# Patient Record
Sex: FEMALE | Race: BLACK OR AFRICAN AMERICAN | URBAN - METROPOLITAN AREA
[De-identification: names, ages, dates, MRNs, and addresses within clinical notes are randomized per-mention and may not be internally consistent; named-entity substitution may affect disease eponyms.]

---

## 2024-03-13 ENCOUNTER — LAB ENCOUNTER (OUTPATIENT)
Dept: LAB | Age: 24
End: 2024-03-13
Attending: FAMILY MEDICINE
Payer: COMMERCIAL

## 2024-03-13 ENCOUNTER — OFFICE VISIT (OUTPATIENT)
Dept: FAMILY MEDICINE CLINIC | Facility: CLINIC | Age: 24
End: 2024-03-13

## 2024-03-13 VITALS
HEIGHT: 62 IN | BODY MASS INDEX: 38.64 KG/M2 | WEIGHT: 210 LBS | SYSTOLIC BLOOD PRESSURE: 126 MMHG | HEART RATE: 88 BPM | DIASTOLIC BLOOD PRESSURE: 85 MMHG

## 2024-03-13 DIAGNOSIS — M79.671 BILATERAL FOOT PAIN: ICD-10-CM

## 2024-03-13 DIAGNOSIS — L70.0 ACNE VULGARIS: ICD-10-CM

## 2024-03-13 DIAGNOSIS — M79.672 BILATERAL FOOT PAIN: ICD-10-CM

## 2024-03-13 DIAGNOSIS — Z00.00 ROUTINE HEALTH MAINTENANCE: Primary | ICD-10-CM

## 2024-03-13 LAB
ALBUMIN SERPL-MCNC: 4.4 G/DL (ref 3.2–4.8)
ALBUMIN/GLOB SERPL: 1.3 {RATIO} (ref 1–2)
ALP LIVER SERPL-CCNC: 68 U/L
ALT SERPL-CCNC: 18 U/L
ANION GAP SERPL CALC-SCNC: 6 MMOL/L (ref 0–18)
AST SERPL-CCNC: 27 U/L (ref ?–34)
BASOPHILS # BLD AUTO: 0.01 X10(3) UL (ref 0–0.2)
BASOPHILS NFR BLD AUTO: 0.2 %
BILIRUB SERPL-MCNC: 0.3 MG/DL (ref 0.3–1.2)
BUN BLD-MCNC: 12 MG/DL (ref 9–23)
BUN/CREAT SERPL: 12.4 (ref 10–20)
CALCIUM BLD-MCNC: 9.4 MG/DL (ref 8.7–10.4)
CHLORIDE SERPL-SCNC: 107 MMOL/L (ref 98–112)
CHOLEST SERPL-MCNC: 177 MG/DL (ref ?–200)
CO2 SERPL-SCNC: 28 MMOL/L (ref 21–32)
CREAT BLD-MCNC: 0.97 MG/DL
DEPRECATED RDW RBC AUTO: 40.2 FL (ref 35.1–46.3)
EGFRCR SERPLBLD CKD-EPI 2021: 84 ML/MIN/1.73M2 (ref 60–?)
EOSINOPHIL # BLD AUTO: 0.24 X10(3) UL (ref 0–0.7)
EOSINOPHIL NFR BLD AUTO: 4.3 %
ERYTHROCYTE [DISTWIDTH] IN BLOOD BY AUTOMATED COUNT: 13.1 % (ref 11–15)
FASTING PATIENT LIPID ANSWER: NO
FASTING STATUS PATIENT QL REPORTED: NO
GLOBULIN PLAS-MCNC: 3.5 G/DL (ref 2.8–4.4)
GLUCOSE BLD-MCNC: 78 MG/DL (ref 70–99)
HCT VFR BLD AUTO: 36.9 %
HDLC SERPL-MCNC: 48 MG/DL (ref 40–59)
HGB BLD-MCNC: 12.5 G/DL
IMM GRANULOCYTES # BLD AUTO: 0.01 X10(3) UL (ref 0–1)
IMM GRANULOCYTES NFR BLD: 0.2 %
LDLC SERPL CALC-MCNC: 98 MG/DL (ref ?–100)
LYMPHOCYTES # BLD AUTO: 2.15 X10(3) UL (ref 1–4)
LYMPHOCYTES NFR BLD AUTO: 38.9 %
MCH RBC QN AUTO: 29.1 PG (ref 26–34)
MCHC RBC AUTO-ENTMCNC: 33.9 G/DL (ref 31–37)
MCV RBC AUTO: 86 FL
MONOCYTES # BLD AUTO: 0.43 X10(3) UL (ref 0.1–1)
MONOCYTES NFR BLD AUTO: 7.8 %
NEUTROPHILS # BLD AUTO: 2.69 X10 (3) UL (ref 1.5–7.7)
NEUTROPHILS # BLD AUTO: 2.69 X10(3) UL (ref 1.5–7.7)
NEUTROPHILS NFR BLD AUTO: 48.6 %
NONHDLC SERPL-MCNC: 129 MG/DL (ref ?–130)
OSMOLALITY SERPL CALC.SUM OF ELEC: 291 MOSM/KG (ref 275–295)
PLATELET # BLD AUTO: 229 10(3)UL (ref 150–450)
POTASSIUM SERPL-SCNC: 4 MMOL/L (ref 3.5–5.1)
PROT SERPL-MCNC: 7.9 G/DL (ref 5.7–8.2)
RBC # BLD AUTO: 4.29 X10(6)UL
SODIUM SERPL-SCNC: 141 MMOL/L (ref 136–145)
TRIGL SERPL-MCNC: 180 MG/DL (ref 30–149)
VLDLC SERPL CALC-MCNC: 30 MG/DL (ref 0–30)
WBC # BLD AUTO: 5.5 X10(3) UL (ref 4–11)

## 2024-03-13 PROCEDURE — 80061 LIPID PANEL: CPT | Performed by: FAMILY MEDICINE

## 2024-03-13 PROCEDURE — 36415 COLL VENOUS BLD VENIPUNCTURE: CPT | Performed by: FAMILY MEDICINE

## 2024-03-13 PROCEDURE — 80053 COMPREHEN METABOLIC PANEL: CPT | Performed by: FAMILY MEDICINE

## 2024-03-13 PROCEDURE — 99385 PREV VISIT NEW AGE 18-39: CPT | Performed by: FAMILY MEDICINE

## 2024-03-13 PROCEDURE — 90471 IMMUNIZATION ADMIN: CPT | Performed by: FAMILY MEDICINE

## 2024-03-13 PROCEDURE — 90651 9VHPV VACCINE 2/3 DOSE IM: CPT | Performed by: FAMILY MEDICINE

## 2024-03-13 PROCEDURE — 85025 COMPLETE CBC W/AUTO DIFF WBC: CPT | Performed by: FAMILY MEDICINE

## 2024-03-13 RX ORDER — MINOCYCLINE HYDROCHLORIDE 100 MG/1
CAPSULE ORAL
COMMUNITY
Start: 2024-02-16

## 2024-03-13 RX ORDER — AZELAIC ACID 0.15 G/G
GEL TOPICAL
COMMUNITY
Start: 2024-02-16

## 2024-03-13 NOTE — PROGRESS NOTES
Tiara Baxter is a 23 year old female.  Chief Complaint   Patient presents with    Routine Physical    Foot Pain     C/o bilateral foot pain, pain is more on the bottom of the foot       HPI:   Patient is a 23-year-old new patient presents today for routine health care to establish care.  Patient seeing a dermatologist for acne currently on a retinoid and minocycline.  Patient having bilateral foot pain has bilateral flatfeet.    Current Outpatient Medications   Medication Sig Dispense Refill    minocycline 100 MG Oral Cap       Azelaic Acid 15 % External Gel         History reviewed. No pertinent past medical history.   History reviewed. No pertinent surgical history.   Social History:  Social History     Socioeconomic History    Marital status: Single   Tobacco Use    Smoking status: Never    Smokeless tobacco: Never   Vaping Use    Vaping Use: Never used   Substance and Sexual Activity    Drug use: Yes     Comment: socially        REVIEW OF SYSTEMS:   GENERAL HEALTH: No fevers, chills, sweats, fatigue  VISION: No recent vision problems, blurry vision or double vision  HEENT: No decreased hearing ear pain nasal congestion or sore throat  SKIN: denies any unusual skin lesions or rashes  RESPIRATORY: denies shortness of breath, cough, wheezing  CARDIOVASCULAR: denies chest pain on exertion, palpitations, swelling in feet  GI: denies abdominal pain and denies heartburn, nausea or vomiting  : No Pain on urination, change in the color of urine, discharge, urinating frequently  MUS: No back pain, joint pain, muscle pain  NEURO: denies headaches , anxiety, depression    EXAM:   /85 (BP Location: Left arm, Patient Position: Sitting, Cuff Size: large)   Pulse 88   Ht 5' 2\" (1.575 m)   Wt 210 lb (95.3 kg)   LMP 03/10/2024 (Exact Date)   BMI 38.41 kg/m²   GENERAL: well developed, well nourished,in no apparent distress  SKIN: no rashes,no suspicious lesions  HEENT: atraumatic, normocephalic,ears and throat are  clear,   NECK: supple,no adenopathy,  LUNGS: clear to auscultation, no wheeze  CARDIO: RRR without murmur  GI: good BS's,no masses or tenderness  EXTREMITIES: no cyanosis, or edema    ASSESSMENT AND PLAN:   1. Routine health maintenance  Discussed diet and exercise with patient today we will draw labs today.  - CBC With Differential With Platelet  - Comp Metabolic Panel (14)  - Lipid Panel    2. Bilateral foot pain  Referral to podiatry given for possible inserts.  - Podiatry Referral - In Network    3. Acne vulgaris  Offered patient birth control pills in addition to what she is taking she is going to think about it.       The patient indicates understanding of these issues and agrees to the plan.  No follow-ups on file.

## 2024-03-24 ENCOUNTER — HOSPITAL ENCOUNTER (OUTPATIENT)
Age: 24
Discharge: HOME | End: 2024-03-24
Payer: COMMERCIAL

## 2024-03-24 VITALS
HEART RATE: 80 BPM | SYSTOLIC BLOOD PRESSURE: 142 MMHG | DIASTOLIC BLOOD PRESSURE: 82 MMHG | TEMPERATURE: 98 F | OXYGEN SATURATION: 100 % | RESPIRATION RATE: 20 BRPM

## 2024-03-24 DIAGNOSIS — B37.31 VAGINAL YEAST INFECTION: Primary | ICD-10-CM

## 2024-03-24 LAB — B-HCG UR QL: NEGATIVE

## 2024-03-24 PROCEDURE — 81025 URINE PREGNANCY TEST: CPT | Performed by: PHYSICIAN ASSISTANT

## 2024-03-24 PROCEDURE — 99213 OFFICE O/P EST LOW 20 MIN: CPT | Performed by: PHYSICIAN ASSISTANT

## 2024-03-24 RX ORDER — FLUCONAZOLE 150 MG/1
150 TABLET ORAL ONCE
Qty: 1 TABLET | Refills: 1 | Status: SHIPPED | OUTPATIENT
Start: 2024-03-24 | End: 2024-03-24

## 2024-03-24 NOTE — ED INITIAL ASSESSMENT (HPI)
Pt with thick, white vaginal discharge since Friday, then minor itching today; denies fever, urinary symptoms, or pelvic pain

## 2024-03-24 NOTE — ED PROVIDER NOTES
Patient Seen in: [facility]      History     Chief Complaint   Patient presents with    Vaginal Problem     Stated Complaint: Eval G    Subjective:   HPI    Patient is a healthy 23-year-old female who presents to immediate care due to vaginal discharge x 1 week.  States worsening white clumpy vaginal discharge.  Notes mild intermittent itching.  Symptoms feel similar to previous vaginal yeast infection.  Patient denies known STI exposure.  Denies pelvic pain flank pain fever dysuria hematuria.    Objective:   History reviewed. No pertinent past medical history.           History reviewed. No pertinent surgical history.             Social History     Socioeconomic History    Marital status: Single   Tobacco Use    Smoking status: Never    Smokeless tobacco: Never   Vaping Use    Vaping Use: Never used   Substance and Sexual Activity    Drug use: Yes     Comment: socially              Review of Systems    Positive for stated complaint: Eval G  Other systems are as noted in HPI.  Constitutional and vital signs reviewed.      All other systems reviewed and negative except as noted above.    Physical Exam     ED Triage Vitals [03/24/24 1203]   /82   Pulse 80   Resp 20   Temp 97.6 °F (36.4 °C)   Temp src Temporal   SpO2 100 %   O2 Device None (Room air)       Current:/82   Pulse 80   Temp 97.6 °F (36.4 °C) (Temporal)   Resp 20   LMP 03/03/2024 (Approximate)   SpO2 100%         Physical Exam    Vital signs reviewed. Nursing note reviewed.  Constitutional: Well-developed. Well-nourished. In no acute distress  HENT: Mucous membranes moist.   EYES: No scleral icterus or conjunctival injection.  NECK: Full ROM. Supple.   CARDIAC: Normal rate. Normal S1/ S2. 2+ distal pulses. No edema  PULM/CHEST: Clear to auscultation bilaterally. No wheezes  ABD: Soft, non-tender, non-distended.   : No CVA tenderness.  White clumpy vaginal discharge.  No CMT no adnexal tenderness  RECTAL:  deferred  Extremities: Full ROM  NEURO: Awake, alert, following commands, moving extremities, answering questions.   SKIN: Warm and dry. No rash or lesions.  PSYCH: Normal judgment. Normal affect.          ED Course     Labs Reviewed   POCT PREGNANCY URINE - Normal   VAGINITIS VAGINOSIS PCR PANEL   CHLAMYDIA/GONOCOCCUS, NURIA                      MDM      Patient is a 23-year-old healthy female who presents to immediate care due to vaginal itching and discharge x 1 week.  Patient arrives with stable vitals.  Physical exam showing clumpy vaginal white discharge.  No CMT or adnexal tenderness unlikely PID or TOA.  Most likely vaginal yeast infection.  Will treat with Diflucan.  Urine pregnancy negative.  Low risk for STIs however will test for gonorrhea chlamydia.  History given by patient.  Patient agreeable to plan all questions answered.                                   Medical Decision Making      Disposition and Plan     Clinical Impression:  1. Vaginal yeast infection         Disposition:  Discharge  3/24/2024 12:41 pm    Follow-up:  Fior Quinonez MD  [address] [place] [address]  [phone]    Call             Medications Prescribed:  Discharge Medication List as of 3/24/2024 12:43 PM        START taking these medications    Details   fluconazole (DIFLUCAN) 150 MG Oral Tab Take 1 tablet (150 mg total) by mouth once for 1 dose., Normal, Disp-1 tablet, R-1

## 2024-03-25 LAB
BV BACTERIA DNA VAG QL NAA+PROBE: NEGATIVE
C GLABRATA DNA VAG QL NAA+PROBE: NEGATIVE
C KRUSEI DNA VAG QL NAA+PROBE: NEGATIVE
C TRACH DNA SPEC QL NAA+PROBE: NEGATIVE
CANDIDA DNA VAG QL NAA+PROBE: POSITIVE
N GONORRHOEA DNA SPEC QL NAA+PROBE: NEGATIVE
T VAGINALIS DNA VAG QL NAA+PROBE: NEGATIVE

## 2024-04-04 ENCOUNTER — TELEPHONE (OUTPATIENT)
Dept: FAMILY MEDICINE CLINIC | Facility: CLINIC | Age: 24
End: 2024-04-04

## 2024-04-04 NOTE — TELEPHONE ENCOUNTER
I called patient and made aware the plan B pill is over the counter. She states she would like insurance to cover as they have in the past. She is asking for Dr. Quinonez to send an Rx. I made her aware I will convey the above to Dr. Quinonez. Patient verbalized understanding. No further questions or concerns at this time.    Dr. Quinonez to advise, Rx pended

## 2024-04-04 NOTE — TELEPHONE ENCOUNTER
Patient calling to request a prescription for Plan B pill    Patient goes to [facility] in [place].    Please call patient when script has been sent.

## 2024-04-05 RX ORDER — LEVONORGESTREL 1.5 MG/1
1.5 TABLET ORAL ONCE
Qty: 1 TABLET | Refills: 0 | Status: SHIPPED | OUTPATIENT
Start: 2024-04-05 | End: 2024-04-05

## 2024-04-11 ENCOUNTER — NURSE ONLY (OUTPATIENT)
Dept: FAMILY MEDICINE CLINIC | Facility: CLINIC | Age: 24
End: 2024-04-11

## 2024-04-11 DIAGNOSIS — Z23 NEED FOR VACCINATION: Primary | ICD-10-CM

## 2024-04-11 PROCEDURE — 90651 9VHPV VACCINE 2/3 DOSE IM: CPT | Performed by: FAMILY MEDICINE

## 2024-04-11 PROCEDURE — 90471 IMMUNIZATION ADMIN: CPT | Performed by: FAMILY MEDICINE

## 2024-04-29 ENCOUNTER — OFFICE VISIT (OUTPATIENT)
Dept: OBGYN CLINIC | Facility: CLINIC | Age: 24
End: 2024-04-29
Payer: COMMERCIAL

## 2024-04-29 VITALS
DIASTOLIC BLOOD PRESSURE: 84 MMHG | BODY MASS INDEX: 37.54 KG/M2 | WEIGHT: 204 LBS | SYSTOLIC BLOOD PRESSURE: 126 MMHG | HEIGHT: 62 IN

## 2024-04-29 DIAGNOSIS — Z01.419 ENCOUNTER FOR GYNECOLOGICAL EXAMINATION WITHOUT ABNORMAL FINDING: Primary | ICD-10-CM

## 2024-04-29 DIAGNOSIS — F32.1 CURRENT MODERATE EPISODE OF MAJOR DEPRESSIVE DISORDER, UNSPECIFIED WHETHER RECURRENT (HCC): ICD-10-CM

## 2024-04-29 PROCEDURE — 87591 N.GONORRHOEAE DNA AMP PROB: CPT | Performed by: OBSTETRICS & GYNECOLOGY

## 2024-04-29 PROCEDURE — 87491 CHLMYD TRACH DNA AMP PROBE: CPT | Performed by: OBSTETRICS & GYNECOLOGY

## 2024-04-29 RX ORDER — MELOXICAM 7.5 MG/1
7.5 TABLET ORAL DAILY
COMMUNITY

## 2024-04-29 RX ORDER — VENLAFAXINE HYDROCHLORIDE 75 MG/1
75 CAPSULE, EXTENDED RELEASE ORAL DAILY
Qty: 90 CAPSULE | Refills: 3 | Status: SHIPPED | OUTPATIENT
Start: 2024-04-29

## 2024-04-29 NOTE — PROGRESS NOTES
GYN H&P     2024  6:06 PM    CC: Patient is here for annual. Pap UTD    HPI: Patient is a 23 year old  for annual.     Menses: once a month, no heavy bleeding no pain  She is using condoms for birth control. She does not want anything else.     1. Little interest or pleasure in doing things: Several days  2. Feeling down, depressed, or hopeless: More than half the days  3. Trouble falling or staying asleep, or sleeping too much: More than half the days  4. Feeling tired or having little energy: Several days  5. Poor appetite or overeating: Nearly every day  6. Feeling bad about yourself - or that you are a failure or have let yourself or your family down: More than half the days  7. Trouble concentrating on things, such as reading the newspaper or watching television: Not at all  8. Moving or speaking so slowly that other people could have noticed. Or the opposite - being so fidgety or restless that you have been moving around a lot more than usual: Not at all  9. Thoughts that you would be better off dead, or of hurting yourself in some way: Several days  PHQ-9 TOTAL SCORE: 12  If you checked off any problems, how difficult have these problems made it for you to do your work, take care of things at home, or get along with other people?: Somewhat difficult     She is having problems with her BF and was also sad about getting an . She was under the care of the therapist. She also does not have a good relationship with her mom. She feels like she has been battling depression on/off since age 12.     Patient's last menstrual period was 2024 (exact date).    OB History    Para Term  AB Living   1       1 0   SAB IAB Ectopic Multiple Live Births     1            # Outcome Date GA Lbr Vimal/2nd Weight Sex Type Anes PTL Lv   1 IAB 23               GYN hx:    Hx Prior Abnormal Pap: No  Pap Date: 22  Pap Result Notes: wnl        Past Medical History:    Acne    Depression      History reviewed. No pertinent surgical history.  Allergies   Allergen Reactions    Seasonal OTHER (SEE COMMENTS)     Family History   Problem Relation Age of Onset    Depression Mother     High Blood Pressure Father     Depression Sister     Breast Cancer Maternal Grandmother 50        unsure of age    Cancer Paternal Grandmother         liver    Alcohol abuse Paternal Grandmother     Alcohol abuse Paternal Grandfather     Cancer Paternal Grandfather         liver    Ovarian Cancer Neg     Colon Cancer Neg      Social History     Socioeconomic History    Marital status: Single   Occupational History    Occupation:  at [employer]   Tobacco Use    Smoking status: Never    Smokeless tobacco: Never   Vaping Use    Vaping status: Never Used   Substance and Sexual Activity    Alcohol use: Yes     Comment: occasionally    Drug use: Not Currently     Comment: socially    Sexual activity: Yes     Partners: Male     Birth control/protection: Condom     Social History     Social History Narrative    Lives with boy friend Yunier    Feels safe    No hx of abuse       Medications reviewed. See active list.     /84   Ht 62\"   Wt 204 lb (92.5 kg)   LMP 04/16/2024 (Exact Date)   BMI 37.31 kg/m²       Exam:   GENERAL: well developed, well nourished, in no apparent distress  SKIN: no rashes, no suspicious lesions  HEENT: normal  NECK: supple; no thyroidmegaly, no adenopathy  BREASTS: symmetrical, nontender, no palpable masses or nodes, no nipple discharge, no skin changes, no dippling, no palpable axillary adenopathy  ABDOMEN: Soft, non distended; non tender, no masses.  Liver and spleen non-tender, no enlargement. No palpable hernias  GYNE/:  External Genitalia: Normal appearing, no lesions, normal hair distribution   Urethral meatus appear wnl, no abnormal discharge or lesions noted.   Bladder: well supported, urethra wnl, no palpable tenderness or masses, no discharge  Vagina: normal pink mucosa, no  lesions, normal clear discharge.   Uterus: midline, mobile, non-tender, firm and smooth  Cervix: pink, no lesions grossly visible, no discharge  Adnexa: non tender, no palpable masses, normal size  Anus:  No lesions or visible hemorrhoids        A/P: Patient is 23 year old female     1. Encounter for gynecological examination without abnormal finding    2. Current moderate episode of major depressive disorder, unspecified whether recurrent (HCC)  - venlafaxine ER 75 MG Oral Capsule SR 24 Hr; Take 1 capsule (75 mg total) by mouth daily.  Dispense: 90 capsule; Refill: 3    Recommend FU with PCP for further treatment and evaluation of depression  I dw pt birth control options - OCP's, Nuvaring, Orthoevrapatch, Depoprovera, IUD (Mirena/Paragard), and Nexplanon.  Risks v. Benefits dw patient.  I also dw pt use of condoms to prevent STI's.. Written and verbal info given re: LARC and IUD's.     Hilary Sylvester MD

## 2024-04-30 LAB
C TRACH DNA SPEC QL NAA+PROBE: NEGATIVE
N GONORRHOEA DNA SPEC QL NAA+PROBE: NEGATIVE

## 2024-06-20 ENCOUNTER — LAB ENCOUNTER (OUTPATIENT)
Dept: LAB | Age: 24
End: 2024-06-20
Attending: FAMILY MEDICINE

## 2024-06-20 ENCOUNTER — OFFICE VISIT (OUTPATIENT)
Dept: FAMILY MEDICINE CLINIC | Facility: CLINIC | Age: 24
End: 2024-06-20

## 2024-06-20 VITALS
WEIGHT: 210 LBS | HEART RATE: 59 BPM | BODY MASS INDEX: 38 KG/M2 | DIASTOLIC BLOOD PRESSURE: 82 MMHG | RESPIRATION RATE: 16 BRPM | TEMPERATURE: 98 F | SYSTOLIC BLOOD PRESSURE: 128 MMHG

## 2024-06-20 DIAGNOSIS — L65.9 HAIR LOSS: Primary | ICD-10-CM

## 2024-06-20 DIAGNOSIS — L65.9 HAIR LOSS: ICD-10-CM

## 2024-06-20 LAB
DEPRECATED HBV CORE AB SER IA-ACNC: 25.7 NG/ML
IRON SATN MFR SERPL: 11 %
IRON SERPL-MCNC: 48 UG/DL
TIBC SERPL-MCNC: 419 UG/DL (ref 250–425)
TRANSFERRIN SERPL-MCNC: 281 MG/DL (ref 250–380)
TSI SER-ACNC: 0.82 MIU/ML (ref 0.55–4.78)

## 2024-06-20 PROCEDURE — 83540 ASSAY OF IRON: CPT

## 2024-06-20 PROCEDURE — 36415 COLL VENOUS BLD VENIPUNCTURE: CPT

## 2024-06-20 PROCEDURE — 82728 ASSAY OF FERRITIN: CPT

## 2024-06-20 PROCEDURE — 84466 ASSAY OF TRANSFERRIN: CPT

## 2024-06-20 PROCEDURE — 84443 ASSAY THYROID STIM HORMONE: CPT

## 2024-06-20 PROCEDURE — 99213 OFFICE O/P EST LOW 20 MIN: CPT | Performed by: FAMILY MEDICINE

## 2024-06-20 RX ORDER — LORATADINE 10 MG/1
10 TABLET ORAL DAILY
COMMUNITY

## 2024-06-20 NOTE — PROGRESS NOTES
HPI: Tiara is a 23 year old female who presents for hair loss. Has some hair loss and thinning.  Started around front area since she was little. Grows longer but not thicker. No patches. Mom has thin hair as well.     PMH:    Past Medical History:    Acne    Depression      Alg:  Seasonal   Meds:   Current Outpatient Medications on File Prior to Visit   Medication Sig Dispense Refill    loratadine 10 MG Oral Tab Take 1 tablet (10 mg total) by mouth daily.      Methylsulfonylmethane (MSM OR) Take by mouth.      Meloxicam 7.5 MG Oral Tab Take 1 tablet (7.5 mg total) by mouth daily.      venlafaxine ER 75 MG Oral Capsule SR 24 Hr Take 1 capsule (75 mg total) by mouth daily. 90 capsule 3    minocycline 100 MG Oral Cap       Azelaic Acid 15 % External Gel        No current facility-administered medications on file prior to visit.      Tobacco Use: no    Objective:   Gen: AOx3. NAD.   Resp 16   Wt 210 lb (95.3 kg)   LMP 04/16/2024 (Exact Date)   BMI 38.41 kg/m²   Skin: thinning hair noted to the front of scalp. Patch of missing hair noted to left lower temple    Assessment:/Plan:  Encounter Diagnosis   Name Primary?    Hair loss Yes     Patient has had hair loss since she was a child.  Seems to be worsening.  Will check thyroid and iron levels.  Refer to dermatology to discuss further.    Colleen Weiler, DO

## 2024-07-25 ENCOUNTER — TELEPHONE (OUTPATIENT)
Dept: FAMILY MEDICINE CLINIC | Facility: CLINIC | Age: 24
End: 2024-07-25

## 2024-07-25 NOTE — TELEPHONE ENCOUNTER
Spoke to patient, states she is calling to schedule an appointment (name and  of patient verified). Patient reports nausea started when starting Venlafaxine 3 months ago  Advised to take with food, still caused nausea regardless if taken with food or at a different time of day  Stopped taking medication last week; now no symptoms of nausea  Medication was prescribed for depression. Did not feel it helped much when taking it.   Marc Santos  crisis line phone number provided: [phone].  Advised to call office with questions or if symptoms change or worsen, verbalizes understanding.  Follow up appointment scheduled (patient requested appointment in [place] since she works in [place]):  Future Appointments   Date Time Provider Department Center   2024  4:10 PM Krish Forde,  [facility] [place]     Medication Quantity Refills Start End   venlafaxine ER 75 MG Oral Capsule SR 24 Hr 90 capsule 3 2024 --   Sig:   Take 1 capsule (75 mg total) by mouth daily.

## 2024-08-10 ENCOUNTER — E-VISIT (OUTPATIENT)
Dept: TELEHEALTH | Age: 24
End: 2024-08-10
Payer: COMMERCIAL

## 2024-08-10 DIAGNOSIS — N89.8 VAGINAL DISCHARGE: Primary | ICD-10-CM

## 2024-08-11 PROCEDURE — 99422 OL DIG E/M SVC 11-20 MIN: CPT | Performed by: PHYSICIAN ASSISTANT

## 2024-08-11 NOTE — PATIENT INSTRUCTIONS
-Must be seen with worsening symptoms  -Trial over the counter Monistat.  If no improvement, clinic exam advised.

## 2024-08-11 NOTE — PROGRESS NOTES
HPI:  Tiara Baxter is a 23 year old female who presents for an evisit.  See [employer] communications above.    Current Outpatient Medications   Medication Sig Dispense Refill    escitalopram 10 MG Oral Tab Take 1 tablet (10 mg total) by mouth daily. 30 tablet 2    Methylsulfonylmethane (MSM OR) Take by mouth.      Meloxicam 7.5 MG Oral Tab Take 1 tablet (7.5 mg total) by mouth daily.      minocycline 100 MG Oral Cap       Azelaic Acid 15 % External Gel        Past Medical History:    Acne    Depression     No past surgical history on file.    Social History     Socioeconomic History    Marital status: Single   Occupational History    Occupation:  at [employer]   Tobacco Use    Smoking status: Never    Smokeless tobacco: Never   Vaping Use    Vaping status: Never Used   Substance and Sexual Activity    Alcohol use: Yes     Comment: occasionally    Drug use: Not Currently     Comment: socially    Sexual activity: Yes     Partners: Male     Birth control/protection: Condom   Other Topics Concern    Blood Transfusions No   Social History Narrative    Lives with boy friend Yunier    Feels safe    No hx of abuse          No results found for this or any previous visit (from the past 24 hour(s)).    ASSESSMENT AND PLAN:      ASSESSMENT:   Encounter Diagnosis   Name Primary?    Vaginal discharge Yes       PLAN: Meds as below.  See patient Instructions  -Total of 11 minutes spent with patient.    Meds & Refills for this Visit:  Requested Prescriptions      No prescriptions requested or ordered in this encounter       Risks, benefits, and side effects of medication explained and discussed.    Patient Instructions   -Must be seen with worsening symptoms  -Trial over the counter Monistat.  If no improvement, clinic exam advised.      The patient indicates understanding of these issues and agrees to the plan.  See attached patient references.  The patient is asked to return if sx's persist or worsen.    Tiara Baxter  understands evisit evaluation is not a substitute for face-to-face examination or emergency care. Patient advised to go to ER or call 911 for worsening symptoms or acute distress.

## 2024-08-12 ENCOUNTER — LAB ENCOUNTER (OUTPATIENT)
Dept: LAB | Age: 24
End: 2024-08-12
Attending: PHYSICIAN ASSISTANT
Payer: COMMERCIAL

## 2024-08-12 ENCOUNTER — OFFICE VISIT (OUTPATIENT)
Dept: DERMATOLOGY CLINIC | Facility: CLINIC | Age: 24
End: 2024-08-12
Payer: COMMERCIAL

## 2024-08-12 DIAGNOSIS — L65.9 HAIR LOSS: ICD-10-CM

## 2024-08-12 DIAGNOSIS — L70.0 ACNE VULGARIS: Primary | ICD-10-CM

## 2024-08-12 LAB
DHEA-S SERPL-MCNC: 244.8 UG/DL
VIT D+METAB SERPL-MCNC: 30.4 NG/ML (ref 30–100)

## 2024-08-12 PROCEDURE — 84410 TESTOSTERONE BIOAVAILABLE: CPT | Performed by: PHYSICIAN ASSISTANT

## 2024-08-12 PROCEDURE — 36415 COLL VENOUS BLD VENIPUNCTURE: CPT | Performed by: PHYSICIAN ASSISTANT

## 2024-08-12 PROCEDURE — 82306 VITAMIN D 25 HYDROXY: CPT

## 2024-08-12 PROCEDURE — 82785 ASSAY OF IGE: CPT | Performed by: PHYSICIAN ASSISTANT

## 2024-08-12 PROCEDURE — 86003 ALLG SPEC IGE CRUDE XTRC EA: CPT | Performed by: PHYSICIAN ASSISTANT

## 2024-08-12 PROCEDURE — 82627 DEHYDROEPIANDROSTERONE: CPT | Performed by: PHYSICIAN ASSISTANT

## 2024-08-12 NOTE — PROGRESS NOTES
HPI:    Patient ID: Tiara Baxter is a 23 year old female.    Patient presents for hair thinning along her edges of her scalp. Patient thinks it may be hereditary. Patient also presents for facial acne for the past 4 years. Currently using cerave-BPO wash with some improvement. Has tried minocycline and azaleic acid in the past with no improvement. No draining or tenderness noted. No allergies to medications noted. Just on the face, nothing on chest or back. Does get worse with her period. Also used to take aldactone, and worked well and then stopped working. Took the medication for 2 years. Used tretinoin and worked well. Ran out and couldn't refill the medication. Tried doxycycline and did not work.         Review of Systems   Constitutional:  Negative for chills and fever.   Musculoskeletal:  Negative for arthralgias and myalgias.   Skin:  Positive for rash. Negative for color change and wound.            Current Outpatient Medications   Medication Sig Dispense Refill    tretinoin 0.025 % External Cream Apply 1 Application topically nightly. Use as tolerated 30 g 3    escitalopram 10 MG Oral Tab Take 1 tablet (10 mg total) by mouth daily. 30 tablet 2    Methylsulfonylmethane (MSM OR) Take by mouth. (Patient not taking: Reported on 8/12/2024)      Meloxicam 7.5 MG Oral Tab Take 1 tablet (7.5 mg total) by mouth daily. (Patient not taking: Reported on 8/12/2024)      minocycline 100 MG Oral Cap  (Patient not taking: Reported on 8/12/2024)      Azelaic Acid 15 % External Gel  (Patient not taking: Reported on 8/12/2024)       Allergies:  Allergies   Allergen Reactions    Seasonal OTHER (SEE COMMENTS)      [facility] 07/20/2024 (Exact Date)   There is no height or weight on file to calculate BMI.  PHYSICAL EXAM:   Physical Exam  Constitutional:       General: She is not in acute distress.     Appearance: Normal appearance.   Skin:     General: Skin is warm and dry.      Findings: Rash present.      Comments: Acne noted on the  cheeks, chin and forehead. No draining or tendernes snoted. No scaling noted. No erythema noted.     Thinning hair noted as well. No draining or tenderness noted. No scaling noted. No erythema noted.    Neurological:      Mental Status: She is alert and oriented to person, place, and time.                ASSESSMENT/PLAN:   1. Acne vulgaris  -After discussion with patient, advised the following:  -Ordered testing  -Will call with reuslts.   -Will base treatment on results   -Started tretionoin  -Educated to apply small amount at night a few days a week with moisturizer.   -To call or follow-up with worsening symptoms or concerns.   -Pt was agreeable to plan and will comply with discussion above.     - Adult Food Allergy Prof  - Testosterone,Total and Weakly Bound w/ SHBG  - Dehydroepiandrosterone Sulfate    2. Hair loss  -After discussion with patient, advised the following:  -Ordered Vitamin D  -Will call with results   -Iron normal.   -TSH normal   -Will determine treatment based on lab results.   -To call or follow-up with worsening symptoms or concerns.   -Pt was agreeable to plan and will comply with discussion above.     - Vitamin D; Future      Orders Placed This Encounter   Procedures    Adult Food Allergy Prof    Testosterone,Total and Weakly Bound w/ SHBG    Dehydroepiandrosterone Sulfate    Vitamin D       Meds This Visit:  Requested Prescriptions     Signed Prescriptions Disp Refills    tretinoin 0.025 % External Cream 30 g 3     Sig: Apply 1 Application topically nightly. Use as tolerated       Imaging & Referrals:  None         ID#[id number]

## 2024-08-16 LAB
ALLERGEN BRAZIL NUT: <0.1 KUA/L (ref ?–0.1)
ALMOND IGE QN: <0.1 KUA/L (ref ?–0.1)
CASHEW NUT IGE QN: <0.1 KUA/L (ref ?–0.1)
CLAM IGE QN: <0.1 KUA/L (ref ?–0.1)
CODFISH IGE QN: <0.1 KUA/L (ref ?–0.1)
CORN IGE QN: <0.1 KUA/L (ref ?–0.1)
COW MILK IGE QN: <0.1 KUA/L (ref ?–0.1)
EGG WHITE IGE QN: <0.1 KUA/L (ref ?–0.1)
HAZELNUT IGE QN: <0.1 KUA/L (ref ?–0.1)
IGE SERPL-ACNC: 20.8 KU/L (ref 2–214)
PEANUT IGE QN: <0.1 KUA/L (ref ?–0.1)
SALMON IGE QN: <0.1 KUA/L (ref ?–0.1)
SCALLOP IGE QN: <0.1 KUA/L (ref ?–0.1)
SESAME SEED IGE QN: <0.1 KUA/L (ref ?–0.1)
SHRIMP IGE QN: <0.1 KUA/L (ref ?–0.1)
SOYBEAN IGE QN: <0.1 KUA/L (ref ?–0.1)
WALNUT IGE QN: <0.1 KUA/L (ref ?–0.1)
WHEAT IGE QN: <0.1 KUA/L (ref ?–0.1)

## 2024-08-18 LAB
SEX HORM BIND GLOB: 19.8 NMOL/L
TESTOST % FREE+WEAK BND: 31.6 %
TESTOST FREE+WEAK BND: 9.1 NG/DL
TESTOSTERONE TOT /MS: 28.9 NG/DL

## 2024-10-08 NOTE — TELEPHONE ENCOUNTER
escitalopram 10 MG Oral Tab, Take 1 tablet (10 mg total) by mouth daily., Disp: 30 tablet, Rfl: 2

## 2024-10-10 RX ORDER — ESCITALOPRAM OXALATE 10 MG/1
10 TABLET ORAL DAILY
Qty: 30 TABLET | Refills: 2 | Status: SHIPPED | OUTPATIENT
Start: 2024-10-10

## 2024-10-10 NOTE — TELEPHONE ENCOUNTER
Refill passed per [facility] protocol.    Please review pended refill request as unable to refill due to high/very high interaction warning copied here:  Current Warnings (1)  High  Drug-Drug: escitalopram and MeloxicamToxic effects may be increased with concurrent administration of NSAIDs and Selective Serotonin Reuptake Inhibitors. The risk of upper gastrointestinal bleeding may be increased. Patients taking both drugs concurrently should be educated about the signs and symptoms of GI bleeding.  Details  Override reason        escitalopram 10 MG Oral Tab  Prescription. Reordered.  Meloxicam 7.5 MG Oral TabPatient reported medicat    Requested Prescriptions   Pending Prescriptions Disp Refills    escitalopram 10 MG Oral Tab 30 tablet 2     Sig: Take 1 tablet (10 mg total) by mouth daily.       Psychiatric Non-Scheduled (Anti-Anxiety) Passed - 10/10/2024 10:30 AM        Passed - In person appointment or virtual visit in the past 6 mos or appointment in next 3 mos     Recent Outpatient Visits              1 [facility] ago [facility] [place] [facility]    Office Visit    2 months ago Vaginal discharge    [facility], Virtual Visit Cherri Livingston PA    E-Visit    2 months ago Other depression    [facility] [place] Cespedes, David B, DO    Office Visit    3 months ago Hair loss    [facility] Weiler, Colleen M, DO    Office Visit    5 months ago Encounter for gynecological examination without abnormal finding    [facility] - OB/GYN Hilary Sylvester MD    Office Visit                      Passed - Depression Screening completed within the past 12 months           Recent Out[facility]its              [facility]    [facility]    2 months ago Vaginal discharge    [facility], Virtual Visit Cherri Livingston PA    E-Visit    2 months ago Other depression    [facility], [facility] [place] Cespedes, David B, DO    Office Visit    3 months ago Hair loss    [facility], [facility] Weiler, Colleen M, DO    Office Visit    5 months ago Encounter for gynecological examination without abnormal finding    [facility], [facility] - OB/GYN Hilary Sylvester MD    Office Visit

## 2024-11-18 ENCOUNTER — TELEPHONE (OUTPATIENT)
Dept: FAMILY MEDICINE CLINIC | Facility: CLINIC | Age: 24
End: 2024-11-18

## 2024-11-18 NOTE — TELEPHONE ENCOUNTER
Pharmacy states medication needs to be dispensed as a 90 day supply per insurance requirements. Please send updated script.       escitalopram 10 MG Oral Tab, Take 1 tablet (10 mg total) by mouth daily., Disp: 30 tablet, Rfl: 2   Jorgito/spouse

## 2024-11-19 ENCOUNTER — TELEPHONE (OUTPATIENT)
Dept: FAMILY MEDICINE CLINIC | Facility: CLINIC | Age: 24
End: 2024-11-19

## 2024-11-19 NOTE — TELEPHONE ENCOUNTER
Escitalopram 10mg: 3 month supply sent to [facility] in [place] on 10/10/2024- patient needs follow up before any refills.

## 2024-11-19 NOTE — TELEPHONE ENCOUNTER
escitalopram 10 MG Oral Tab, Take 1 tablet (10 mg total) by mouth daily., Disp: 30 tablet, Rfl: 2

## 2024-12-03 ENCOUNTER — HOSPITAL ENCOUNTER (OUTPATIENT)
Age: 24
Discharge: HOME | End: 2024-12-03
Payer: COMMERCIAL

## 2024-12-03 VITALS
RESPIRATION RATE: 20 BRPM | TEMPERATURE: 98 F | SYSTOLIC BLOOD PRESSURE: 142 MMHG | OXYGEN SATURATION: 100 % | DIASTOLIC BLOOD PRESSURE: 89 MMHG | HEART RATE: 82 BPM

## 2024-12-03 DIAGNOSIS — B37.31 YEAST VAGINITIS: ICD-10-CM

## 2024-12-03 DIAGNOSIS — Z11.3 SCREEN FOR STD (SEXUALLY TRANSMITTED DISEASE): Primary | ICD-10-CM

## 2024-12-03 LAB — B-HCG UR QL: NEGATIVE

## 2024-12-03 PROCEDURE — 81025 URINE PREGNANCY TEST: CPT | Performed by: NURSE PRACTITIONER

## 2024-12-03 PROCEDURE — 99203 OFFICE O/P NEW LOW 30 MIN: CPT | Performed by: NURSE PRACTITIONER

## 2024-12-03 NOTE — ED PROVIDER NOTES
Patient Seen in: [facility]      History     Chief Complaint   Patient presents with    Sexually Transmitted Infection Screen     Stated Complaint: Check up, STD eval    Subjective:   24 y/o female with depression presents for STI screening.  Patient states has new sexual partner, last intercourse 11/27/2024.  Patient denies any symptoms.  New partner has not told her that they had any STI or symptoms.  No other complaints              Objective:     Past Medical History:    Acne    Depression              History reviewed. No pertinent surgical history.             Social History     Socioeconomic History    Marital status: Single   Occupational History    Occupation:  at [employer]   Tobacco Use    Smoking status: Never    Smokeless tobacco: Never   Vaping Use    Vaping status: Never Used   Substance and Sexual Activity    Alcohol use: Yes     Comment: occasionally    Drug use: Yes     Types: Cannabis     Comment: socially    Sexual activity: Yes     Partners: Male     Birth control/protection: Condom   Other Topics Concern    Blood Transfusions No    History of tanning No    Reaction to local anesthetic No    Pt has a pacemaker No   Social History Narrative    Lives with boy friend Yunier    Feels safe    No hx of abuse              Review of Systems   Constitutional:  Negative for chills and fever.   Gastrointestinal:  Negative for abdominal pain, nausea and vomiting.   Genitourinary:  Negative for dysuria, flank pain, frequency, hematuria and urgency.   All other systems reviewed and are negative.      Positive for stated complaint: Check up, STD eval  Other systems are as noted in HPI.  Constitutional and vital signs reviewed.      All other systems reviewed and negative except as noted above.    Physical Exam     ED Triage Vitals [12/03/24 1743]   /89   Pulse 82   Resp 20   Temp 97.7 °F (36.5 °C)   Temp src Oral   SpO2 100 %   O2 Device None (Room air)       Current Vitals:    Vital Signs  BP: 142/89  Pulse: 82  Resp: 20  Temp: 97.7 °F (36.5 °C)  Temp src: Oral    Oxygen Therapy  SpO2: 100 %  O2 Device: None (Room air)        Physical Exam  Vitals and nursing note reviewed. Chaperone present: offered and declined.   Constitutional:       General: She is not in acute distress.     Appearance: Normal appearance.   HENT:      Mouth/Throat:      Mouth: Mucous membranes are moist.   Cardiovascular:      Rate and Rhythm: Normal rate and regular rhythm.   Pulmonary:      Effort: Pulmonary effort is normal.      Breath sounds: Normal breath sounds.   Genitourinary:     Comments: Patient declined. Self swabbed  Skin:     General: Skin is warm and dry.   Neurological:      Mental Status: She is alert and oriented to person, place, and time.   Psychiatric:         Behavior: Behavior is cooperative.             ED Course     Labs Reviewed   POCT PREGNANCY URINE - Normal   CHLAMYDIA/GONOCOCCUS, NURIA   VAGINITIS VAGINOSIS PCR PANEL                   MDM              Medical Decision Making  Patient is well-appearing.  I discussed differentials with patient including but not limited to STI, vaginitis  R vaginal cultures pending  Discussed with patient will need to follow-up with her PCP and [facility] for additional STI testing including HIV, syphilis, HSV, hepatitis        Problems Addressed:  Screen for STD (sexually transmitted disease): acute illness or injury    Amount and/or Complexity of Data Reviewed  Labs: ordered. Decision-making details documented in ED Course.        Disposition and Plan     Clinical Impression:  1. Screen for STD (sexually transmitted disease)         Disposition:  Discharge  12/3/2024  6:16 pm    Follow-up:  Fior Quinonez MD  [address] [place] [id number]  [phone]                Medications Prescribed:  Discharge Medication List as of 12/3/2024  6:19 PM              Supplementary Documentation:

## 2024-12-03 NOTE — ED INITIAL ASSESSMENT (HPI)
Pt presents with request for STI screening. Pt reports no symptoms, new partner, last exposure 11/27/24.

## 2024-12-04 RX ORDER — FLUCONAZOLE 150 MG/1
150 TABLET ORAL ONCE
Qty: 2 TABLET | Refills: 0 | Status: SHIPPED | OUTPATIENT
Start: 2024-12-04 | End: 2024-12-04

## 2024-12-09 ENCOUNTER — OFFICE VISIT (OUTPATIENT)
Dept: OBGYN CLINIC | Facility: CLINIC | Age: 24
End: 2024-12-09
Payer: COMMERCIAL

## 2024-12-09 ENCOUNTER — LAB ENCOUNTER (OUTPATIENT)
Dept: LAB | Facility: REFERENCE LAB | Age: 24
End: 2024-12-09
Attending: OBSTETRICS & GYNECOLOGY
Payer: COMMERCIAL

## 2024-12-09 VITALS
WEIGHT: 206 LBS | BODY MASS INDEX: 37.91 KG/M2 | DIASTOLIC BLOOD PRESSURE: 78 MMHG | SYSTOLIC BLOOD PRESSURE: 142 MMHG | HEIGHT: 62 IN

## 2024-12-09 DIAGNOSIS — Z11.3 ROUTINE SCREENING FOR STI (SEXUALLY TRANSMITTED INFECTION): ICD-10-CM

## 2024-12-09 DIAGNOSIS — Z11.3 ROUTINE SCREENING FOR STI (SEXUALLY TRANSMITTED INFECTION): Primary | ICD-10-CM

## 2024-12-09 LAB
HBV SURFACE AG SER-ACNC: <0.1 [IU]/L
HBV SURFACE AG SERPL QL IA: NONREACTIVE
HCV AB SERPL QL IA: NONREACTIVE
T PALLIDUM AB SER QL IA: NONREACTIVE

## 2024-12-09 PROCEDURE — 87389 HIV-1 AG W/HIV-1&-2 AB AG IA: CPT

## 2024-12-09 PROCEDURE — 86780 TREPONEMA PALLIDUM: CPT

## 2024-12-09 PROCEDURE — 87340 HEPATITIS B SURFACE AG IA: CPT

## 2024-12-09 PROCEDURE — 36415 COLL VENOUS BLD VENIPUNCTURE: CPT

## 2024-12-09 PROCEDURE — 86803 HEPATITIS C AB TEST: CPT

## 2024-12-09 NOTE — PROGRESS NOTES
CC: Patient is here for sti screen    HPI: Patient is a 23 year old  for sti screen. No concerns. New partner, did not use condoms the first time    She is using OCP's for birth control.     2024 gc/chl and vaginosis screen were negative.       Patient's last menstrual period was 2024 (exact date).    OB History    Para Term  AB Living   1       1 0   SAB IAB Ectopic Multiple Live Births     1            # Outcome Date GA Lbr Vimal/2nd Weight Sex Type Anes PTL Lv   1 IAB 23               GYN hx:       LPS:      Past Medical History:    Acne    Depression     History reviewed. No pertinent surgical history.  Allergies[1]  Family History   Problem Relation Age of Onset    Depression Mother     High Blood Pressure Father     Depression Sister     Breast Cancer Maternal Grandmother 50        unsure of age    Cancer Paternal Grandmother         liver    Alcohol abuse Paternal Grandmother     Alcohol abuse Paternal Grandfather     Cancer Paternal Grandfather         liver    Ovarian Cancer Neg     Colon Cancer Neg      Social History     Socioeconomic History    Marital status: Single     Spouse name: Not on file    Number of children: Not on file    Years of education: Not on file    Highest education level: Not on file   Occupational History    Occupation:  at [employer]   Tobacco Use    Smoking status: Never    Smokeless tobacco: Never   Vaping Use    Vaping status: Never Used   Substance and Sexual Activity    Alcohol use: Yes     Comment: occasionally    Drug use: Yes     Types: Cannabis     Comment: socially    Sexual activity: Yes     Partners: Male     Birth control/protection: Condom   Other Topics Concern     Service Not Asked    Blood Transfusions No    Caffeine Concern Not Asked    Occupational Exposure Not Asked    Hobby Hazards Not Asked    Sleep Concern Not Asked    Stress Concern Not Asked    Weight Concern Not Asked    Special Diet Not Asked    Back  Care Not Asked    Exercise Not Asked    Bike Helmet Not Asked    Seat Belt Not Asked    Self-Exams Not Asked    Grew up on a farm Not Asked    History of tanning No    Outdoor occupation Not Asked    Breast feeding Not Asked    Reaction to local anesthetic No    Pt has a pacemaker No    Pt has a defibrillator Not Asked   Social History Narrative    Lives with boy friend Yunier    Feels safe    No hx of abuse     Social Drivers of Health     Financial Resource Strain: Not on file   Food Insecurity: Not on file   Transportation Needs: Not on file   Stress: Not on file   Housing Stability: Not on file       Medications reviewed. See active list.     /78   Ht 62\"   Wt 206 lb (93.4 kg)   LMP 11/28/2024 (Exact Date)   Breastfeeding No   BMI 37.68 kg/m²       Exam:   GENERAL: well developed, well nourished, in no apparent distress      A/P: Patient is 23 year old female     1. Routine screening for STI (sexually transmitted infection)  - HIV Ag/Ab Combo; Future  - T Pallidum Screening Cascade; Future  - HCV Antibody; Future  - Hepatitis B Surface Antigen; Future    Dw her hse does not need vaginal testing.   Hilary Sylvester MD                [1]   Allergies  Allergen Reactions    Seasonal OTHER (SEE COMMENTS)

## 2025-01-20 ENCOUNTER — HOSPITAL ENCOUNTER (OUTPATIENT)
Age: 25
Discharge: HOME | End: 2025-01-20
Payer: COMMERCIAL

## 2025-01-20 VITALS
OXYGEN SATURATION: 100 % | SYSTOLIC BLOOD PRESSURE: 133 MMHG | HEART RATE: 83 BPM | TEMPERATURE: 100 F | RESPIRATION RATE: 20 BRPM | DIASTOLIC BLOOD PRESSURE: 80 MMHG

## 2025-01-20 DIAGNOSIS — U07.1 COVID-19: Primary | ICD-10-CM

## 2025-01-20 LAB
POCT INFLUENZA A: NEGATIVE
POCT INFLUENZA B: NEGATIVE
SARS-COV-2 RNA RESP QL NAA+PROBE: DETECTED

## 2025-01-20 PROCEDURE — 87502 INFLUENZA DNA AMP PROBE: CPT | Performed by: NURSE PRACTITIONER

## 2025-01-20 PROCEDURE — U0002 COVID-19 LAB TEST NON-CDC: HCPCS | Performed by: NURSE PRACTITIONER

## 2025-01-20 PROCEDURE — 99213 OFFICE O/P EST LOW 20 MIN: CPT | Performed by: NURSE PRACTITIONER

## 2025-01-20 NOTE — ED PROVIDER NOTES
Patient Seen in: [facility]      History     Chief Complaint   Patient presents with    Cough/URI     Stated Complaint: Covid Testing    Subjective:   23 y/o female with medical conditions as noted below presents with complaint of throat irritation Friday night.  Friday developed a fever (max 103), and cough.  No chest pain, shortness of breath, difficulty swallowing, abdominal pain, nausea/vomiting/diarrhea.  Coworker recently diagnosed with COVID              Objective:     Past Medical History:    Acne    Depression              History reviewed. No pertinent surgical history.             No pertinent social history.            Review of Systems   Constitutional:  Positive for fever. Negative for chills.   HENT:  Positive for sore throat. Negative for congestion and rhinorrhea.    Respiratory:  Positive for cough.    Gastrointestinal:  Negative for abdominal pain, diarrhea, nausea and vomiting.   Neurological:  Negative for headaches.   All other systems reviewed and are negative.      Positive for stated complaint: Covid Testing  Other systems are as noted in HPI.  Constitutional and vital signs reviewed.      All other systems reviewed and negative except as noted above.    Physical Exam     ED Triage Vitals [01/20/25 1632]   /80   Pulse 83   Resp 20   Temp 100 °F (37.8 °C)   Temp src Oral   SpO2 100 %   O2 Device None (Room air)       Current Vitals:   Vital Signs  BP: 133/80  Pulse: 83  Resp: 20  Temp: 100 °F (37.8 °C)  Temp src: Oral    Oxygen Therapy  SpO2: 100 %  O2 Device: None (Room air)        Physical Exam  Vitals and nursing note reviewed.   Constitutional:       General: She is not in acute distress.     Appearance: Normal appearance. She is not ill-appearing.   HENT:      Head: Normocephalic.      Right Ear: Tympanic membrane and external ear normal.      Left Ear: Tympanic membrane and external ear normal.      Nose: Nose normal.      Mouth/Throat:      Mouth: Mucous membranes  are moist.      Pharynx: Oropharynx is clear. Uvula midline.      Tonsils: No tonsillar exudate. 1+ on the right. 1+ on the left.   Cardiovascular:      Rate and Rhythm: Normal rate and regular rhythm.   Pulmonary:      Effort: Pulmonary effort is normal.      Breath sounds: Normal breath sounds.   Musculoskeletal:         General: Normal range of motion.      Cervical back: Normal range of motion and neck supple.   Lymphadenopathy:      Cervical: No cervical adenopathy.   Skin:     General: Skin is warm.   Neurological:      Mental Status: She is alert and oriented to person, place, and time.   Psychiatric:         Behavior: Behavior is cooperative.             ED Course     Labs Reviewed   RAPID SARS-COV-2 BY PCR - Abnormal; Notable for the following components:       Result Value    Rapid SARS-CoV-2 by PCR Detected (*)     All other components within normal limits   POCT FLU TEST - Normal    Narrative:     This assay is a rapid molecular in vitro test utilizing nucleic acid amplification of influenza A and B viral RNA.                   MDM              Medical Decision Making  Patient is well-appearing. Resp easy non labored  I discussed differentials with patient including but not limited to viral uri vs COVID influenza vs.  Rapid COVID positive.  Rapid influenza negative  Push fluids, cool mist humidifier, warm salt water gargles, good hand washing  otc meds prn  Fu with PCP. Return/ ED precautions discussed      Problems Addressed:  COVID-19: acute illness or injury    Amount and/or Complexity of Data Reviewed  Labs: ordered. Decision-making details documented in ED Course.    Risk  OTC drugs.        Disposition and Plan     Clinical Impression:  1. COVID-19         Disposition:  Discharge  1/20/2025  4:58 pm    Follow-up:  Fior Quinonez MD  [address] [place] [id number]  [phone]    Schedule an appointment as soon as possible for a visit             Medications Prescribed:  Current  Discharge Medication List              Supplementary Documentation:

## 2025-01-20 NOTE — ED INITIAL ASSESSMENT (HPI)
Pt states Thursday night began having a sensation in her throat, Pt states woke up and had bad cough. Pt states Friday night began having fevers. Pt denies NVD. Pt states has a coworker that had covid.

## 2025-03-09 ENCOUNTER — HOSPITAL ENCOUNTER (OUTPATIENT)
Age: 25
Discharge: HOME | End: 2025-03-09
Payer: COMMERCIAL

## 2025-03-09 VITALS
SYSTOLIC BLOOD PRESSURE: 125 MMHG | HEART RATE: 86 BPM | TEMPERATURE: 99 F | DIASTOLIC BLOOD PRESSURE: 77 MMHG | OXYGEN SATURATION: 100 % | RESPIRATION RATE: 20 BRPM

## 2025-03-09 DIAGNOSIS — B34.9 VIRAL ILLNESS: Primary | ICD-10-CM

## 2025-03-09 DIAGNOSIS — R05.9 COUGH: ICD-10-CM

## 2025-03-09 DIAGNOSIS — R10.2 SUPRAPUBIC DISCOMFORT: ICD-10-CM

## 2025-03-09 LAB
B-HCG UR QL: NEGATIVE
BILIRUB UR QL STRIP: NEGATIVE
CLARITY UR: CLEAR
COLOR UR: YELLOW
GLUCOSE UR STRIP-MCNC: NEGATIVE MG/DL
KETONES UR STRIP-MCNC: NEGATIVE MG/DL
LEUKOCYTE ESTERASE UR QL STRIP: NEGATIVE
NITRITE UR QL STRIP: NEGATIVE
PH UR STRIP: 7 [PH]
POCT INFLUENZA A: NEGATIVE
POCT INFLUENZA B: NEGATIVE
PROT UR STRIP-MCNC: NEGATIVE MG/DL
S PYO AG THROAT QL: NEGATIVE
SP GR UR STRIP: 1.01
UROBILINOGEN UR STRIP-ACNC: <2 MG/DL

## 2025-03-09 NOTE — ED INITIAL ASSESSMENT (HPI)
Pt came in due to cough, congestion, sore throat, runny nose, body aches, and chills for the past week. Pt has easy non labored respirations.

## 2025-03-09 NOTE — DISCHARGE INSTRUCTIONS
- Your lower abdominal pain may be related to your viral illness, upcoming menses, stool burden, or other. At this time, you do not appear to have an acute intra-abdominal process requiring further evaluation. Monitor symptoms and if worsening, follow up with primary care provider or go to ER    GI upset care measures   - Wash hands often   - Disinfect your environment, linens, electronics, etc.   - Drink plenty of fluids (e.g. water, Pedialyte)   - Get plenty of rest   - Do not share utensils or drinks   - Avoid spicy, greasy, fatty, fried, \"fast\" / \"dense\" foods   - Avoid dairy (causes inflammation)   - Avoid soda   - Avoid sugar (causes inflammation)   - Slowly progress diet as tolerated (liquids > smoothies > bananas / toast / apple sauce > others)   - Follow up with your doctor as needed   - Go to ER if symptoms worsen or if you cannot tolerate anything by mouth      Upper respiratory infection supportive care measures to try as applicable:  General:   - There are multiple viruses that cause similar symptoms, including: Influenza, Rhinovirus, Adenovirus, Coronaviruses (including Covid-19), RSV, parainfluenza, etc.   - Duration of symptoms typically depends on your body's ability to heal itself, which can be affected in many ways including metabolic health, diet, and genetics.    - Symptoms typically last between 7-days to 3-weeks   - Most medications do not not cure the illness, but may help to alleviate your symptoms. However, evidence is not strong.   - Antibiotics are NOT effective for viral illnesses   - Wash hands often   - Disinfect your environment, linens, electronics, etc. to limit viral spread   - Change toothbrush to limit viral spread   - Drink plenty of fluids (water, Pedialyte, etc.)   - Get plenty of rest and sleep with head elevated to help with sinus drainage and throat irritation   - Do not share utensils or drinks   - Alternate Ibuprofen (adult: 600mg) and Tylenol (adult: 650-1000mg) as  needed for pain / body aches / fever   - Take a multivitamin and extra Vitamin D (~2000IU) daily, year round   - Prophylactic Vitamin C can help reduce symptoms if you become infected, but is less effective when already ill   - You may benefit from Zinc (~20mg) every day, or every other other day, for a week while sick (Zinc has been shown to kill respiratory viruses)    Sore throat:   - Salt water gargles throughout the day for sore throat   - Cepacol or Ricola lozenges as needed for sore throat    Cough / Sinus:   - Using saline spray or a couple drops into nostrils a couple times a day can help with sinus inflammation (Use nasal spray with nose forward and applicator tip pointed towards outside of eye. Breath normally. You should not feel medication go down your throat.)   - Avoid having air blow on your face as this can worsen congestion / cough   - You may benefit from placing a garlic clove in each nostril for 10-15min which should irritate sinuses, causing you to get rid of stuck mucus. Blow your nose thoroughly afterwards   - You may benefit from spoonfuls of honey (and/or added to warm drinks) throughout the day for cough   - You may benefit from taking a decongestant (e.g. Sudafed - pseudoephedrine [behind the pharmacy counter]) (may temporarily elevate your heart rate and blood pressure)   - You may benefit from cough medication containing \"Dextromethorphan\" (e.g. Delsym)   - You may benefit from using a humidifier and/or steam showers   - You may benefit from Flonase nasal spray daily (Use with head tilted down and tip pointed towards outside of eye. Breath normally. You should not feel medication go down your throat)   - You may benefit from taking a daily allergy medication (e.g. Zyrtec, Xyzal, etc.)   - You may benefit from boiling water with lemon and cayenne pepper, then breathing in the steam (you can cover your head with a towel to help funnel the steam)

## 2025-03-09 NOTE — ED PROVIDER NOTES
History     Chief Complaint   Patient presents with    Cough/URI       Subjective:   HPI    Tiara Baxter, 24 year old female with notable medical history of n/a who presents with URI symptoms. PATIENT reports URI symptoms starting approx 1-week ago with: dec appetite, fatigue, cough, congestion, rhinorrhea, body aches, chills. Patient reports similar s/s when she had Covid19 in January 2025. Tolerating liquids well. Patient also reports suprapubic discomfort starting overnight w/o urinary, bowel, vaginal changes. Patient is sexually active (LMP 2/3/25) and reports being on oral contraception and not missing a dose.      Patient Active Problem List   Diagnosis    Current moderate episode of major depressive disorder (HCC)      Objective:   Past Medical History:    Acne    Depression              History reviewed. No pertinent surgical history.             No pertinent social history.            Medications Ordered Prior to Encounter[1]      Constitutional and vital signs reviewed.      All other systems reviewed and negative except as noted above.    I have reviewed the family history, social history, allergies, and outpatient medications.     History reviewed from EMR: Encounters, problem list, allergies, medications      Physical Exam     ED Triage Vitals [03/09/25 1010]   /77   Pulse 86   Resp 20   Temp 98.7 °F (37.1 °C)   Temp src Oral   SpO2 100 %   O2 Device None (Room air)       Current:/77   Pulse 86   Temp 98.7 °F (37.1 °C) (Oral)   Resp 20   LMP 02/03/2025 (Exact Date)   SpO2 100%       Physical Exam  Vitals and nursing note reviewed.   Constitutional:       General: She is not in acute distress.     Appearance: Normal appearance. She is normal weight. She is not ill-appearing or toxic-appearing.   HENT:      Head: Normocephalic and atraumatic.      Right Ear: External ear normal.      Left Ear: External ear normal.      Nose: Nose normal.      Mouth/Throat:      Mouth: Mucous  membranes are moist.      Pharynx: Oropharynx is clear. Uvula midline.      Tonsils: No tonsillar exudate. 0 on the right.   Eyes:      Extraocular Movements: Extraocular movements intact.      Conjunctiva/sclera: Conjunctivae normal.      Pupils: Pupils are equal, round, and reactive to light.   Cardiovascular:      Rate and Rhythm: Normal rate.      Pulses: Normal pulses.      Heart sounds: Normal heart sounds.   Pulmonary:      Effort: Pulmonary effort is normal. No respiratory distress.      Breath sounds: Normal breath sounds and air entry.      Comments: LS clear, no distress  Abdominal:      Tenderness: There is abdominal tenderness in the suprapubic area.      Comments: Soft, obese, non-acute abdomen. +discomfort to suprapubic / upper pelvic region w/o guarding.   Musculoskeletal:         General: No swelling, tenderness or signs of injury. Normal range of motion.      Cervical back: Normal range of motion and neck supple.   Skin:     General: Skin is warm and dry.      Capillary Refill: Capillary refill takes less than 2 seconds.      Coloration: Skin is not jaundiced.   Neurological:      General: No focal deficit present.      Mental Status: She is alert and oriented to person, place, and time. Mental status is at baseline.   Psychiatric:         Mood and Affect: Mood normal.         Behavior: Behavior normal.         Thought Content: Thought content normal.         Judgment: Judgment normal.            ED Course     Labs Reviewed   [facility] POCT URINALYSIS DIPSTICK - Abnormal; Notable for the following components:       Result Value    Blood, Urine Trace-lysed (*)     All other components within normal limits   POCT RAPID STREP - Normal   POCT PREGNANCY URINE - Normal   POCT FLU TEST - Normal    Narrative:     This assay is a rapid molecular in vitro test utilizing nucleic acid amplification of influenza A and B viral RNA.     No orders to display       Vitals:    03/09/25 1010   BP: 125/77   Pulse: 86    Resp: 20   Temp: 98.7 °F (37.1 °C)   TempSrc: Oral   SpO2: 100%            [facility]        Tiara Baxter, 24 year old female with medical history as noted above who presents with URI symptoms and suprapubic discomfort   - Patient in NAD, VSS   - viral vs strep vs PNA vs other +/- UTI vs pregnancy vs other   - Flu and Strep swabs negative (patient w/in 90-days of Covid19 infection)   - UA & preg ordered       ** See ED course below for additional information on care provided / interventions / notable events throughout patient's encounter.    ** See Home Care Instructions below for care measures to trial as applicable.    ED Course as of 03/09/25 1134  ------------------------------------------------------------  Time: 03/09 1132  Comment: UA not c/w infection. +trace blood, however, in settling of delayed menses and neg pregnancy test, suspect suprapubic discomfort r/t upcoming menses  Supportive care and infection control measures discussed  RTED/IC precautions discussed  Follow up with primary care provider as needed        ** I have independently reviewed the radiology images, clinical lab results, and ECG tracings as described above (if applicable)    ** Concerning co-morbidities possibly affecting complaint / care: n/a    ** See disposition & plan section below for home care instructions - if applicable        Medical Decision Making  Amount and/or Complexity of Data Reviewed  Labs: ordered. Decision-making details documented in ED Course.    Risk  OTC drugs.        Disposition and Plan     Disposition:  Discharge  3/9/2025 11:32 am    Clinical Impression:  1. Viral illness    2. Cough    3. Suprapubic discomfort            Home care instructions:     - Your lower abdominal pain may be related to your viral illness, upcoming menses, stool burden, or other. At this time, you do not appear to have an acute intra-abdominal process requiring further evaluation. Monitor symptoms and if worsening, follow up with  primary care provider or go to ER    GI upset care measures   - Wash hands often   - Disinfect your environment, linens, electronics, etc.   - Drink plenty of fluids (e.g. water, Pedialyte)   - Get plenty of rest   - Do not share utensils or drinks   - Avoid spicy, greasy, fatty, fried, \"fast\" / \"dense\" foods   - Avoid dairy (causes inflammation)   - Avoid soda   - Avoid sugar (causes inflammation)   - Slowly progress diet as tolerated (liquids > smoothies > bananas / toast / apple sauce > others)   - Follow up with your doctor as needed   - Go to ER if symptoms worsen or if you cannot tolerate anything by mouth      Upper respiratory infection supportive care measures to try as applicable:  General:   - There are multiple viruses that cause similar symptoms, including: Influenza, Rhinovirus, Adenovirus, Coronaviruses (including Covid-19), RSV, parainfluenza, etc.   - Duration of symptoms typically depends on your body's ability to heal itself, which can be affected in many ways including metabolic health, diet, and genetics.    - Symptoms typically last between 7-days to 3-weeks   - Most medications do not not cure the illness, but may help to alleviate your symptoms. However, evidence is not strong.   - Antibiotics are NOT effective for viral illnesses   - Wash hands often   - Disinfect your environment, linens, electronics, etc. to limit viral spread   - Change toothbrush to limit viral spread   - Drink plenty of fluids (water, Pedialyte, etc.)   - Get plenty of rest and sleep with head elevated to help with sinus drainage and throat irritation   - Do not share utensils or drinks   - Alternate Ibuprofen (adult: 600mg) and Tylenol (adult: 650-1000mg) as needed for pain / body aches / fever   - Take a multivitamin and extra Vitamin D (~2000IU) daily, year round   - Prophylactic Vitamin C can help reduce symptoms if you become infected, but is less effective when already ill   - You may benefit from Zinc (~20mg)  every day, or every other other day, for a week while sick (Zinc has been shown to kill respiratory viruses)    Sore throat:   - Salt water gargles throughout the day for sore throat   - Cepacol or Ricola lozenges as needed for sore throat    Cough / Sinus:   - Using saline spray or a couple drops into nostrils a couple times a day can help with sinus inflammation (Use nasal spray with nose forward and applicator tip pointed towards outside of eye. Breath normally. You should not feel medication go down your throat.)   - Avoid having air blow on your face as this can worsen congestion / cough   - You may benefit from placing a garlic clove in each nostril for 10-15min which should irritate sinuses, causing you to get rid of stuck mucus. Blow your nose thoroughly afterwards   - You may benefit from spoonfuls of honey (and/or added to warm drinks) throughout the day for cough   - You may benefit from taking a decongestant (e.g. Sudafed - pseudoephedrine [behind the pharmacy counter]) (may temporarily elevate your heart rate and blood pressure)   - You may benefit from cough medication containing \"Dextromethorphan\" (e.g. Delsym)   - You may benefit from using a humidifier and/or steam showers   - You may benefit from Flonase nasal spray daily (Use with head tilted down and tip pointed towards outside of eye. Breath normally. You should not feel medication go down your throat)   - You may benefit from taking a daily allergy medication (e.g. Zyrtec, Xyzal, etc.)   - You may benefit from boiling water with lemon and cayenne pepper, then breathing in the steam (you can cover your head with a towel to help funnel the steam)        Follow-up:  Fior Quinonez MD  [address] [place] [address]  [phone]      As needed          Medications Prescribed:  Current Discharge Medication List            Trever Valdez, DNP, APRN, AGACNP-BC, FNP-C, CNL  Adult-Gerontology Acute Care & Family Nurse  Practitioner  [facility]      The above patient (and/or guardian) was made aware that an appropriate evaluation has been performed, and that no additional testing is required at this time. In my medical judgment, there is currently no evidence of an immediate life-threatening or surgical condition, therefore discharge is indicated at this time. The patient (and/or guardian) was advised that a small risk still exists that a serious condition could develop. The patient was instructed to arrange close follow-up with their primary care provider (or the referral provider given today). The patient received written and verbal instructions regarding their condition / concerns, demonstrated understanding, and is agreement with the outpatient treatment plan.              [1]   No current facility-administered medications on file prior to encounter.     Current Outpatient Medications on File Prior to Encounter   Medication Sig Dispense Refill    VESTURA 3-0.02 MG Oral Tab Take 1 tablet by mouth daily. FOR 21 DAYS IN, THEN REMOVE FOR 7 DAYS      [] escitalopram 10 MG Oral Tab Take 1.5 tablets (15 mg total) by mouth daily. (Patient not taking: Reported on 2025) 135 tablet 1    tretinoin 0.025 % External Cream Apply 1 Application topically nightly. Use as tolerated 30 g 3

## 2025-04-03 ENCOUNTER — OFFICE VISIT (OUTPATIENT)
Dept: FAMILY MEDICINE CLINIC | Facility: CLINIC | Age: 25
End: 2025-04-03
Payer: COMMERCIAL

## 2025-04-03 VITALS
SYSTOLIC BLOOD PRESSURE: 141 MMHG | BODY MASS INDEX: 41 KG/M2 | HEART RATE: 90 BPM | OXYGEN SATURATION: 98 % | TEMPERATURE: 98 F | RESPIRATION RATE: 16 BRPM | DIASTOLIC BLOOD PRESSURE: 81 MMHG | WEIGHT: 222 LBS

## 2025-04-03 DIAGNOSIS — R05.9 COUGH, UNSPECIFIED TYPE: Primary | ICD-10-CM

## 2025-04-03 LAB — COVID19 BINAX NOW ANTIGEN: NOT DETECTED

## 2025-04-03 PROCEDURE — 99213 OFFICE O/P EST LOW 20 MIN: CPT | Performed by: FAMILY MEDICINE

## 2025-04-03 NOTE — PROGRESS NOTES
HPI: Tiara is a 24 year old female who presents for cold symptoms. Has had several illnesses this year.  Had COVID in January. Had cold symptoms in February.  Has cough, sneezing, congestion, decreased appetite, and has sinus pressure. No sore throat or ear pain.     PMH:    Past Medical History:    Acne    Depression      Alg:  Seasonal   Meds: Medications Ordered Prior to Encounter[1]   Tobacco Use: no    Objective:   Gen: AOx3. NAD.  /81   Pulse 90   Temp 98 °F (36.7 °C) (Temporal)   Resp 16   Wt 222 lb (100.7 kg)   LMP 03/20/2025 (Exact Date)   SpO2 98%   BMI 40.60 kg/m²   HEENT: Conjunctive clear.  Charlie ear canals clear.  Charlie TMs intact with good landmarks noted.  Nares patent.  Oral mucous membrane moist.  Normal lips, teeth, and gums.  Oropharynx normal.  Neck supple.  Good ROM.  No LAD.  Thyroid normal.  CV:  Regular rate and rhythm; no murmurs  Lungs:  Clear to ausculation; good aeration               No wheezes, rales or rhonchi        Assessment:/Plan:  Encounter Diagnosis   Name Primary?    Cough, unspecified type Yes       COVID negative.  Likely viral etiology.  Call if worsening.  Advised rest and fluids. Advised rest, healthy diet and hand hygiene.     Colleen Weiler, DO           [1]   Current Outpatient Medications on File Prior to Visit   Medication Sig Dispense Refill    VESTURA 3-0.02 MG Oral Tab Take 1 tablet by mouth daily. FOR 21 DAYS IN, THEN REMOVE FOR 7 DAYS      tretinoin 0.025 % External Cream Apply 1 Application topically nightly. Use as tolerated 30 g 3     No current facility-administered medications on file prior to visit.

## 2025-04-09 ENCOUNTER — E-VISIT (OUTPATIENT)
Dept: TELEHEALTH | Age: 25
End: 2025-04-09
Payer: COMMERCIAL

## 2025-04-09 DIAGNOSIS — N89.8 VAGINAL DISCHARGE: Primary | ICD-10-CM

## 2025-04-09 DIAGNOSIS — N76.0 ACUTE VAGINITIS: ICD-10-CM

## 2025-04-09 PROCEDURE — 99422 OL DIG E/M SVC 11-20 MIN: CPT | Performed by: PHYSICIAN ASSISTANT

## 2025-04-09 RX ORDER — FLUCONAZOLE 150 MG/1
150 TABLET ORAL ONCE
Qty: 1 TABLET | Refills: 0 | Status: SHIPPED | OUTPATIENT
Start: 2025-04-09 | End: 2025-04-09

## 2025-04-09 NOTE — PROGRESS NOTES
Tiara Baxter is a 24 year old female.  HPI:   See answers to questions above.     Current Medications[1]   Past Medical History[2]   Past Surgical History[3]   Family History[4]   Social History:  Short Social Hx on File[5]      ASSESSMENT AND PLAN:     Encounter Diagnoses   Name Primary?    Vaginal discharge Yes    Acute vaginitis        Vag discharge (thick and white), no abnormal odor, (+) vaginal itching. Tried monistat with no relief. No concern for STI. Diflucan as directed    Meds & Refills for this Visit:  Requested Prescriptions     Signed Prescriptions Disp Refills    fluconazole 150 MG Oral Tab 1 tablet 0     Sig: Take 1 tablet (150 mg total) by mouth once for 1 dose.       Duration of  the service:  15 minutes    Patient advised to follow up with PCP if no improvement or worsening of symptoms  Refer to [employer] message for specific patient instructions                   [1]   Current Outpatient Medications   Medication Sig Dispense Refill    fluconazole 150 MG Oral Tab Take 1 tablet (150 mg total) by mouth once for 1 dose. 1 tablet 0    VESTURA 3-0.02 MG Oral Tab Take 1 tablet by mouth daily. FOR 21 DAYS IN, THEN REMOVE FOR 7 DAYS      tretinoin 0.025 % External Cream Apply 1 Application topically nightly. Use as tolerated 30 g 3   [2]   Past Medical History:   Acne    Depression   [3] No past surgical history on file.  [4]   Family History  Problem Relation Age of Onset    Depression Mother     High Blood Pressure Father     Depression Sister     Breast Cancer Maternal Grandmother 50        unsure of age    Cancer Paternal Grandmother         liver    Alcohol abuse Paternal Grandmother     Alcohol abuse Paternal Grandfather     Cancer Paternal Grandfather         liver    Ovarian Cancer Neg     Colon Cancer Neg    [5]   Social History  Socioeconomic History    Marital status: Single   Occupational History    Occupation:  at [employer]   Tobacco Use    Smoking status: Never    Smokeless  tobacco: Never   Vaping Use    Vaping status: Never Used   Substance and Sexual Activity    Alcohol use: Yes     Comment: occasionally    Drug use: Yes     Types: Cannabis     Comment: socially    Sexual activity: Yes     Partners: Male     Birth control/protection: Condom   Other Topics Concern    Blood Transfusions No    History of tanning No    Reaction to local anesthetic No    Pt has a pacemaker No   Social History Narrative    Lives with boy friend Yunier    Feels safe    No hx of abuse

## 2025-06-28 ENCOUNTER — HOSPITAL ENCOUNTER (OUTPATIENT)
Age: 25
Discharge: HOME | End: 2025-06-28
Payer: COMMERCIAL

## 2025-06-28 VITALS
TEMPERATURE: 100 F | SYSTOLIC BLOOD PRESSURE: 139 MMHG | RESPIRATION RATE: 14 BRPM | HEART RATE: 98 BPM | OXYGEN SATURATION: 99 % | DIASTOLIC BLOOD PRESSURE: 95 MMHG

## 2025-06-28 DIAGNOSIS — J02.9 VIRAL PHARYNGITIS: ICD-10-CM

## 2025-06-28 DIAGNOSIS — R50.9 FEVER: Primary | ICD-10-CM

## 2025-06-28 LAB
POCT INFLUENZA A: NEGATIVE
POCT INFLUENZA B: NEGATIVE
S PYO AG THROAT QL: NEGATIVE
SARS-COV-2 RNA RESP QL NAA+PROBE: NOT DETECTED

## 2025-06-28 PROCEDURE — U0002 COVID-19 LAB TEST NON-CDC: HCPCS | Performed by: PHYSICIAN ASSISTANT

## 2025-06-28 PROCEDURE — 87880 STREP A ASSAY W/OPTIC: CPT | Performed by: PHYSICIAN ASSISTANT

## 2025-06-28 PROCEDURE — 99213 OFFICE O/P EST LOW 20 MIN: CPT | Performed by: PHYSICIAN ASSISTANT

## 2025-06-28 PROCEDURE — 87502 INFLUENZA DNA AMP PROBE: CPT | Performed by: PHYSICIAN ASSISTANT

## 2025-06-28 RX ORDER — DEXAMETHASONE SODIUM PHOSPHATE 10 MG/ML
0.6 INJECTION, SOLUTION INTRAMUSCULAR; INTRAVENOUS ONCE
Status: DISCONTINUED | OUTPATIENT
Start: 2025-06-28 | End: 2025-06-28

## 2025-06-28 RX ORDER — ACETAMINOPHEN 325 MG/1
650 TABLET ORAL ONCE
Status: COMPLETED | OUTPATIENT
Start: 2025-06-28 | End: 2025-06-28

## 2025-06-28 RX ORDER — DEXAMETHASONE SODIUM PHOSPHATE 10 MG/ML
10 INJECTION, SOLUTION INTRAMUSCULAR; INTRAVENOUS ONCE
Status: COMPLETED | OUTPATIENT
Start: 2025-06-28 | End: 2025-06-28

## 2025-06-28 NOTE — ED PROVIDER NOTES
Patient Seen in: [facility]        History  Chief Complaint   Patient presents with    Sore Throat     Stated Complaint: flu like symptoms    Subjective:   HPI          Tiara Baxter is a 24 year old female  presents with throat pain x 2 days. Patient reports dysphagia to both solids and liquids, ear pain, rhinorrhea, fevers, chills. Patient denies shortness of breath, respiratory distress, stridor, neck pain/ stiffness, headache, eye pain/ redness, facial/ lip/ eyelid swelling. No medications taken prior to arrival. No alleviating/ aggravating factors. Pain 3/10        Objective:     Past Medical History:    Acne    Depression              History reviewed. No pertinent surgical history.             No pertinent social history.            Review of Systems   All other systems reviewed and are negative.      Positive for stated complaint: flu like symptoms  Other systems are as noted in HPI.  Constitutional and vital signs reviewed.      All other systems reviewed and negative except as noted above.                  Physical Exam    ED Triage Vitals [06/28/25 1406]   BP (!) 139/95   Pulse 98   Resp 14   Temp 100.4 °F (38 °C)   Temp src Oral   SpO2 99 %   O2 Device None (Room air)       Current Vitals:   Vital Signs  BP: (!) 139/95  Pulse: 98  Resp: 14  Temp: 100.4 °F (38 °C)  Temp src: Oral    Oxygen Therapy  SpO2: 99 %  O2 Device: None (Room air)            Physical Exam  Vitals and nursing note reviewed.   Constitutional:       General: She is not in acute distress.     Appearance: Normal appearance. She is normal weight. She is not ill-appearing, toxic-appearing or diaphoretic.   HENT:      Head: Normocephalic and atraumatic.      Right Ear: Tympanic membrane, ear canal and external ear normal.      Left Ear: Tympanic membrane, ear canal and external ear normal.      Nose: Congestion present. No rhinorrhea.      Mouth/Throat:      Mouth: Mucous membranes are moist.      Pharynx: Oropharynx is  clear. No oropharyngeal exudate or posterior oropharyngeal erythema.   Eyes:      Extraocular Movements: Extraocular movements intact.      Conjunctiva/sclera: Conjunctivae normal.      Pupils: Pupils are equal, round, and reactive to light.   Pulmonary:      Effort: Pulmonary effort is normal. No respiratory distress.      Breath sounds: No stridor. No wheezing, rhonchi or rales.   Chest:      Chest wall: No tenderness.   Musculoskeletal:         General: No swelling, tenderness, deformity or signs of injury. Normal range of motion.      Cervical back: Normal range of motion and neck supple.   Lymphadenopathy:      Cervical: No cervical adenopathy.   Skin:     General: Skin is warm and dry.      Capillary Refill: Capillary refill takes less than 2 seconds.      Coloration: Skin is not jaundiced or pale.      Findings: No bruising, erythema, lesion or rash.   Neurological:      General: No focal deficit present.      Mental Status: She is alert and oriented to person, place, and time. Mental status is at baseline.   Psychiatric:         Mood and Affect: Mood normal.         Behavior: Behavior normal.                 ED Course  Labs Reviewed   POCT RAPID STREP - Normal   POCT FLU TEST - Normal    Narrative:     This assay is a rapid molecular in vitro test utilizing nucleic acid amplification of influenza A and B viral RNA.   RAPID SARS-COV-2 BY PCR - Normal     Results for orders placed or performed during the hospital encounter of 06/28/25   POCT Flu Test    Collection Time: 06/28/25  2:10 PM    Specimen: Nares; Other   Result Value Ref Range    POCT INFLUENZA A Negative Negative    POCT INFLUENZA B Negative Negative   Rapid SARS-CoV-2 by PCR    Collection Time: 06/28/25  2:16 PM    Specimen: Nares; Other   Result Value Ref Range    Rapid SARS-CoV-2 by PCR Not Detected Not Detected   POCT Rapid Strep    Collection Time: 06/28/25  2:18 PM   Result Value Ref Range    POCT Rapid Strep Negative Negative                               MDM            Medical Decision Making  24 year old well appearing female  presents with throat pain x 2 days.  Considerations to include but not limited to peritonsillar abscess versus viral pharyngitis versus mononucleosis versus retropharyngeal abscess  Plan  - SpO2 99% on room air    - labs: flu  A and B/ strep/ COVID 19 swab  - meds: decadron 10mg po now   - reassess   - OTC: Tylenol 1 g po q 6 hours/ prn.  ibuprofen 600mg po q 8 hours/ prn. cepacol lozenges po every 4-6 hours/ prn.   - encourage oral fluid rehydration and warm salt water rinses for symptomatic relief.   - refer to primary care physician  - Return to ED if symptoms worsen        Amount and/or Complexity of Data Reviewed  Labs: ordered. Decision-making details documented in ED Course.     Details: flu  A and B/ strep/ COVID 19 swab- negative           Disposition and Plan     Clinical Impression:  1. Fever    2. Viral pharyngitis         Disposition:  Discharge  6/28/2025  2:51 pm    Follow-up:  Fior Quinonez MD  [address] [address]  [phone]          [facility]  [address] [facility] [address]  [phone]              Medications Prescribed:  Discharge Medication List as of 6/28/2025  3:03 PM                Supplementary Documentation:

## 2025-06-28 NOTE — ED INITIAL ASSESSMENT (HPI)
Pt came in due to sore throat, fever, congestion, body aches, and chills since yesterday. Pt has easy non labored respirations.

## 2025-06-30 ENCOUNTER — TELEPHONE (OUTPATIENT)
Dept: FAMILY MEDICINE CLINIC | Facility: CLINIC | Age: 25
End: 2025-06-30

## 2025-06-30 DIAGNOSIS — Z00.00 ROUTINE HEALTH MAINTENANCE: Primary | ICD-10-CM

## 2025-06-30 NOTE — TELEPHONE ENCOUNTER
Dr. Weiler kindly see message below and advise. Pended C lab order only for review and approval. Did not include other labs requested vitamin D, B12, folate, immunoglobulin, ferritin and iron.      Future Appointments   Date Time Provider Department Center   8/7/2025  1:00 PM Weiler, Colleen M, DO [facility]

## 2025-06-30 NOTE — TELEPHONE ENCOUNTER
Patient called requesting orders for blood work before her upcoming appointment. She stated she has been sick and cannot wait until August to do blood work.     Vitamin D    Thyroid    B12    CBC with differential    Folate    Immunoglobulin    Ferritin and Iron

## 2025-07-01 NOTE — TELEPHONE ENCOUNTER
I placed the orders.  I'm not really sure what immunoglobulins she is looking for so I didn't place that order specifically

## 2025-08-07 ENCOUNTER — LAB ENCOUNTER (OUTPATIENT)
Dept: LAB | Age: 25
End: 2025-08-07
Attending: FAMILY MEDICINE

## 2025-08-07 ENCOUNTER — OFFICE VISIT (OUTPATIENT)
Dept: FAMILY MEDICINE CLINIC | Facility: CLINIC | Age: 25
End: 2025-08-07

## 2025-08-07 VITALS
OXYGEN SATURATION: 99 % | RESPIRATION RATE: 19 BRPM | TEMPERATURE: 98 F | SYSTOLIC BLOOD PRESSURE: 110 MMHG | HEART RATE: 82 BPM | DIASTOLIC BLOOD PRESSURE: 72 MMHG | WEIGHT: 218 LBS | BODY MASS INDEX: 39.61 KG/M2 | HEIGHT: 62.21 IN

## 2025-08-07 DIAGNOSIS — Z12.4 SCREENING FOR CERVICAL CANCER: ICD-10-CM

## 2025-08-07 DIAGNOSIS — R19.7 DIARRHEA, UNSPECIFIED TYPE: ICD-10-CM

## 2025-08-07 DIAGNOSIS — E66.812 CLASS 2 OBESITY DUE TO EXCESS CALORIES WITHOUT SERIOUS COMORBIDITY WITH BODY MASS INDEX (BMI) OF 39.0 TO 39.9 IN ADULT: ICD-10-CM

## 2025-08-07 DIAGNOSIS — Z00.00 ROUTINE PHYSICAL EXAMINATION: Primary | ICD-10-CM

## 2025-08-07 DIAGNOSIS — Z00.00 ROUTINE PHYSICAL EXAMINATION: ICD-10-CM

## 2025-08-07 DIAGNOSIS — Z00.00 ROUTINE HEALTH MAINTENANCE: ICD-10-CM

## 2025-08-07 DIAGNOSIS — E66.09 CLASS 2 OBESITY DUE TO EXCESS CALORIES WITHOUT SERIOUS COMORBIDITY WITH BODY MASS INDEX (BMI) OF 39.0 TO 39.9 IN ADULT: ICD-10-CM

## 2025-08-07 LAB
ALBUMIN SERPL-MCNC: 4.4 G/DL (ref 3.2–4.8)
ALBUMIN/GLOB SERPL: 1.4 (ref 1–2)
ALP LIVER SERPL-CCNC: 62 U/L (ref 37–98)
ALT SERPL-CCNC: 17 U/L (ref 10–49)
ANION GAP SERPL CALC-SCNC: 8 MMOL/L (ref 0–18)
AST SERPL-CCNC: 20 U/L (ref ?–34)
BASOPHILS # BLD AUTO: 0.03 X10(3) UL (ref 0–0.2)
BASOPHILS NFR BLD AUTO: 0.5 %
BILIRUB SERPL-MCNC: 0.3 MG/DL (ref 0.3–1.2)
BUN BLD-MCNC: 9 MG/DL (ref 9–23)
BUN/CREAT SERPL: 9.4 (ref 10–20)
CALCIUM BLD-MCNC: 9.3 MG/DL (ref 8.7–10.4)
CHLORIDE SERPL-SCNC: 105 MMOL/L (ref 98–112)
CHOLEST SERPL-MCNC: 187 MG/DL (ref ?–200)
CO2 SERPL-SCNC: 25 MMOL/L (ref 21–32)
CREAT BLD-MCNC: 0.96 MG/DL (ref 0.55–1.02)
DEPRECATED HBV CORE AB SER IA-ACNC: 24 NG/ML (ref 50–306)
DEPRECATED RDW RBC AUTO: 43.8 FL (ref 35.1–46.3)
EGFRCR SERPLBLD CKD-EPI 2021: 84 ML/MIN/1.73M2 (ref 60–?)
EOSINOPHIL # BLD AUTO: 0.12 X10(3) UL (ref 0–0.7)
EOSINOPHIL NFR BLD AUTO: 2 %
ERYTHROCYTE [DISTWIDTH] IN BLOOD BY AUTOMATED COUNT: 13.9 % (ref 11–15)
FASTING PATIENT LIPID ANSWER: NO
FASTING STATUS PATIENT QL REPORTED: NO
FOLATE SERPL-MCNC: 18.6 NG/ML (ref 5.4–?)
GLOBULIN PLAS-MCNC: 3.2 G/DL (ref 2–3.5)
GLUCOSE BLD-MCNC: 90 MG/DL (ref 70–99)
HBV SURFACE AB SER QL: NONREACTIVE
HBV SURFACE AB SERPL IA-ACNC: <3.1 MIU/ML
HCT VFR BLD AUTO: 35.6 % (ref 35–48)
HDLC SERPL-MCNC: 78 MG/DL (ref 40–59)
HGB BLD-MCNC: 11.4 G/DL (ref 12–16)
IGA SERPL-MCNC: 234.7 MG/DL (ref 70–312)
IMM GRANULOCYTES # BLD AUTO: 0 X10(3) UL (ref 0–1)
IMM GRANULOCYTES NFR BLD: 0 %
IRON SATN MFR SERPL: 6 % (ref 15–50)
IRON SERPL-MCNC: 26 UG/DL (ref 50–170)
LDLC SERPL CALC-MCNC: 94 MG/DL (ref ?–100)
LYMPHOCYTES # BLD AUTO: 2.61 X10(3) UL (ref 1–4)
LYMPHOCYTES NFR BLD AUTO: 44.1 %
MCH RBC QN AUTO: 27.9 PG (ref 26–34)
MCHC RBC AUTO-ENTMCNC: 32 G/DL (ref 31–37)
MCV RBC AUTO: 87.3 FL (ref 80–100)
MONOCYTES # BLD AUTO: 0.44 X10(3) UL (ref 0.1–1)
MONOCYTES NFR BLD AUTO: 7.4 %
NEUTROPHILS # BLD AUTO: 2.72 X10 (3) UL (ref 1.5–7.7)
NEUTROPHILS # BLD AUTO: 2.72 X10(3) UL (ref 1.5–7.7)
NEUTROPHILS NFR BLD AUTO: 46 %
NONHDLC SERPL-MCNC: 109 MG/DL (ref ?–130)
OSMOLALITY SERPL CALC.SUM OF ELEC: 284 MOSM/KG (ref 275–295)
PLATELET # BLD AUTO: 280 10(3)UL (ref 150–450)
POTASSIUM SERPL-SCNC: 4.1 MMOL/L (ref 3.5–5.1)
PROT SERPL-MCNC: 7.6 G/DL (ref 5.7–8.2)
RBC # BLD AUTO: 4.08 X10(6)UL (ref 3.8–5.3)
SODIUM SERPL-SCNC: 138 MMOL/L (ref 136–145)
T PALLIDUM AB SER QL IA: NONREACTIVE
T3FREE SERPL-MCNC: 2.98 PG/ML (ref 2.4–4.2)
T4 FREE SERPL-MCNC: 1.3 NG/DL (ref 0.8–1.7)
TOTAL IRON BINDING CAPACITY: 406 UG/DL (ref 250–425)
TRANSFERRIN SERPL-MCNC: 334 MG/DL (ref 250–380)
TRIGL SERPL-MCNC: 85 MG/DL (ref 30–149)
TSI SER-ACNC: 0.54 UIU/ML (ref 0.55–4.78)
VIT B12 SERPL-MCNC: 479 PG/ML (ref 211–911)
VIT D+METAB SERPL-MCNC: 35.3 NG/ML (ref 30–100)
VLDLC SERPL CALC-MCNC: 14 MG/DL (ref 0–30)
WBC # BLD AUTO: 5.9 X10(3) UL (ref 4–11)

## 2025-08-07 PROCEDURE — 84443 ASSAY THYROID STIM HORMONE: CPT

## 2025-08-07 PROCEDURE — 84481 FREE ASSAY (FT-3): CPT

## 2025-08-07 PROCEDURE — 99395 PREV VISIT EST AGE 18-39: CPT | Performed by: FAMILY MEDICINE

## 2025-08-07 PROCEDURE — 86706 HEP B SURFACE ANTIBODY: CPT

## 2025-08-07 PROCEDURE — 86780 TREPONEMA PALLIDUM: CPT | Performed by: FAMILY MEDICINE

## 2025-08-07 PROCEDURE — 82306 VITAMIN D 25 HYDROXY: CPT

## 2025-08-07 PROCEDURE — 82607 VITAMIN B-12: CPT

## 2025-08-07 PROCEDURE — 83540 ASSAY OF IRON: CPT

## 2025-08-07 PROCEDURE — 84466 ASSAY OF TRANSFERRIN: CPT

## 2025-08-07 PROCEDURE — 82784 ASSAY IGA/IGD/IGG/IGM EACH: CPT

## 2025-08-07 PROCEDURE — 84439 ASSAY OF FREE THYROXINE: CPT

## 2025-08-07 PROCEDURE — 86364 TISS TRNSGLTMNASE EA IG CLAS: CPT

## 2025-08-07 PROCEDURE — 87389 HIV-1 AG W/HIV-1&-2 AB AG IA: CPT | Performed by: FAMILY MEDICINE

## 2025-08-07 PROCEDURE — 80061 LIPID PANEL: CPT

## 2025-08-07 PROCEDURE — 82746 ASSAY OF FOLIC ACID SERUM: CPT

## 2025-08-07 PROCEDURE — 82728 ASSAY OF FERRITIN: CPT

## 2025-08-07 PROCEDURE — 36415 COLL VENOUS BLD VENIPUNCTURE: CPT

## 2025-08-07 PROCEDURE — 85025 COMPLETE CBC W/AUTO DIFF WBC: CPT

## 2025-08-07 PROCEDURE — 80053 COMPREHEN METABOLIC PANEL: CPT

## 2025-08-08 LAB
C TRACH DNA SPEC QL NAA+PROBE: NEGATIVE
N GONORRHOEA DNA SPEC QL NAA+PROBE: NEGATIVE
TTG IGA SER-ACNC: 0.3 U/ML (ref ?–7)

## 2025-08-12 LAB
LAST PAP RESULT: NORMAL
PAP HISTORY (OTHER THAN LAST PAP): NORMAL

## (undated) NOTE — LETTER
Date & Time: 3/9/2025, 11:39 AM  Patient: Tiara Baxter  Encounter Provider(s):    Trever Valdez APRN       To Whom It May Concern:    Tiara Baxter was seen and treated in our department on 3/9/2025. She can return to work.    If you have any questions or concerns, please do not hesitate to call.        __GARETT Duque______  Physician/APC Signature